# Patient Record
Sex: FEMALE | URBAN - NONMETROPOLITAN AREA
[De-identification: names, ages, dates, MRNs, and addresses within clinical notes are randomized per-mention and may not be internally consistent; named-entity substitution may affect disease eponyms.]

---

## 2022-08-17 ENCOUNTER — APPOINTMENT (OUTPATIENT)
Age: 77
Setting detail: DERMATOLOGY
End: 2022-08-17

## 2022-08-17 DIAGNOSIS — D17 BENIGN LIPOMATOUS NEOPLASM: ICD-10-CM

## 2022-08-17 DIAGNOSIS — L57.0 ACTINIC KERATOSIS: ICD-10-CM

## 2022-08-17 PROBLEM — D48.5 NEOPLASM OF UNCERTAIN BEHAVIOR OF SKIN: Status: ACTIVE | Noted: 2022-08-17

## 2022-08-17 PROBLEM — D17.0 BENIGN LIPOMATOUS NEOPLASM OF SKIN AND SUBCUTANEOUS TISSUE OF HEAD, FACE AND NECK: Status: ACTIVE | Noted: 2022-08-17

## 2022-08-17 PROCEDURE — OTHER ORDER ULTRASOUND: OTHER

## 2022-08-17 PROCEDURE — OTHER COUNSELING: OTHER

## 2022-08-17 PROCEDURE — OTHER MIPS QUALITY: OTHER

## 2022-08-17 PROCEDURE — 99202 OFFICE O/P NEW SF 15 MIN: CPT | Mod: 25

## 2022-08-17 PROCEDURE — 11102 TANGNTL BX SKIN SINGLE LES: CPT

## 2022-08-17 PROCEDURE — OTHER BIOPSY BY SHAVE METHOD: OTHER

## 2022-08-17 ASSESSMENT — LOCATION DETAILED DESCRIPTION DERM
LOCATION DETAILED: RIGHT CENTRAL MALAR CHEEK
LOCATION DETAILED: LEFT DISTAL POSTERIOR UPPER ARM

## 2022-08-17 ASSESSMENT — LOCATION SIMPLE DESCRIPTION DERM
LOCATION SIMPLE: LEFT POSTERIOR UPPER ARM
LOCATION SIMPLE: RIGHT CHEEK

## 2022-08-17 ASSESSMENT — LOCATION ZONE DERM
LOCATION ZONE: ARM
LOCATION ZONE: FACE

## 2022-08-17 NOTE — PROCEDURE: BIOPSY BY SHAVE METHOD
Bill For Surgical Tray: no
Depth Of Biopsy: dermis
Curettage Text: The wound bed was treated with curettage after the biopsy was performed.
Anesthesia Volume In Cc (Will Not Render If 0): 0.5
Cryotherapy Text: The wound bed was treated with cryotherapy after the biopsy was performed.
Biopsy Type: H and E
Additional Anesthesia Volume In Cc (Will Not Render If 0): 0
Was A Bandage Applied: Yes
Electrodesiccation And Curettage Text: The wound bed was treated with electrodesiccation and curettage after the biopsy was performed.
Dressing: bandage
Type Of Destruction Used: Curettage
Anesthesia Type: 1% lidocaine with epinephrine
Consent: Written consent was obtained and risks were reviewed including but not limited to scarring, infection, bleeding, scabbing, incomplete removal, nerve damage and allergy to anesthesia.
Post-Care Instructions: I reviewed with the patient in detail post-care instructions. Patient is to keep the biopsy site dry overnight, and then apply bacitracin twice daily until healed. Patient may apply hydrogen peroxide soaks to remove any crusting.
Silver Nitrate Text: The wound bed was treated with silver nitrate after the biopsy was performed.
Detail Level: Detailed
Hemostasis: Aluminum Chloride
Billing Type: Third-Party Bill
Biopsy Method: Dermablade
Notification Instructions: Patient will be notified of biopsy results. However, patient instructed to call the office if not contacted within 2 weeks.
Wound Care: Bacitracin
Information: Selecting Yes will display possible errors in your note based on the variables you have selected. This validation is only offered as a suggestion for you. PLEASE NOTE THAT THE VALIDATION TEXT WILL BE REMOVED WHEN YOU FINALIZE YOUR NOTE. IF YOU WANT TO FAX A PRELIMINARY NOTE YOU WILL NEED TO TOGGLE THIS TO 'NO' IF YOU DO NOT WANT IT IN YOUR FAXED NOTE.
Electrodesiccation Text: The wound bed was treated with electrodesiccation after the biopsy was performed.

## 2022-08-17 NOTE — PROCEDURE: ORDER ULTRASOUND
Detail Level: Simple
Subcutaneous Lesion Ultrasound Reason: Mass, in right cheek, check vascular and nerve integration.
Ultrasound Protocol: Ultrasound of Subcutaneous Mass
Priority: high
Provider: Bennie Boston
Breast Ultrasound Reason: Sub q mass

## 2022-09-15 ENCOUNTER — APPOINTMENT (OUTPATIENT)
Age: 77
Setting detail: DERMATOLOGY
End: 2022-09-15

## 2022-09-15 DIAGNOSIS — D17 BENIGN LIPOMATOUS NEOPLASM: ICD-10-CM

## 2022-09-15 PROBLEM — D17.0 BENIGN LIPOMATOUS NEOPLASM OF SKIN AND SUBCUTANEOUS TISSUE OF HEAD, FACE AND NECK: Status: ACTIVE | Noted: 2022-09-15

## 2022-09-15 PROBLEM — D04.62 CARCINOMA IN SITU OF SKIN OF LEFT UPPER LIMB, INCLUDING SHOULDER: Status: ACTIVE | Noted: 2022-09-15

## 2022-09-15 PROCEDURE — OTHER ADDITIONAL NOTES: OTHER

## 2022-09-15 PROCEDURE — 99212 OFFICE O/P EST SF 10 MIN: CPT

## 2022-09-15 PROCEDURE — OTHER COUNSELING: OTHER

## 2022-09-15 PROCEDURE — OTHER MIPS QUALITY: OTHER

## 2022-09-15 PROCEDURE — OTHER REFERRAL: OTHER

## 2022-09-15 ASSESSMENT — LOCATION SIMPLE DESCRIPTION DERM: LOCATION SIMPLE: RIGHT CHEEK

## 2022-09-15 ASSESSMENT — LOCATION ZONE DERM: LOCATION ZONE: FACE

## 2022-09-15 ASSESSMENT — LOCATION DETAILED DESCRIPTION DERM: LOCATION DETAILED: RIGHT CENTRAL MALAR CHEEK

## 2022-09-15 NOTE — PROCEDURE: ADDITIONAL NOTES
Detail Level: Simple
Additional Notes: Referral for general surgeon
Render Risk Assessment In Note?: no

## 2022-10-17 ENCOUNTER — APPOINTMENT (OUTPATIENT)
Age: 77
Setting detail: DERMATOLOGY
End: 2022-10-17

## 2022-10-17 PROBLEM — D04.62 CARCINOMA IN SITU OF SKIN OF LEFT UPPER LIMB, INCLUDING SHOULDER: Status: ACTIVE | Noted: 2022-10-17

## 2022-10-17 PROCEDURE — OTHER COUNSELING: OTHER

## 2022-10-17 PROCEDURE — OTHER PRESCRIPTION: OTHER

## 2022-10-17 PROCEDURE — 17313 MOHS 1 STAGE T/A/L: CPT

## 2022-10-17 PROCEDURE — OTHER MOHS SURGERY: OTHER

## 2022-10-17 PROCEDURE — OTHER REPAIR NOTE: OTHER

## 2022-10-17 PROCEDURE — 13121 CMPLX RPR S/A/L 2.6-7.5 CM: CPT

## 2022-10-17 RX ORDER — MUPIROCIN 20 MG/G
OINTMENT TOPICAL BID
Qty: 22 | Refills: 2 | Status: ERX | COMMUNITY
Start: 2022-10-17

## 2022-10-17 RX ORDER — CEPHALEXIN 500 MG/1
CAPSULE ORAL BID
Qty: 28 | Refills: 0 | Status: ERX | COMMUNITY
Start: 2022-10-17

## 2022-11-02 ENCOUNTER — APPOINTMENT (OUTPATIENT)
Age: 77
Setting detail: DERMATOLOGY
End: 2022-11-02

## 2022-11-02 DIAGNOSIS — Z48.02 ENCOUNTER FOR REMOVAL OF SUTURES: ICD-10-CM

## 2022-11-02 PROCEDURE — OTHER COUNSELING: OTHER

## 2022-11-02 PROCEDURE — OTHER SUTURE REMOVAL (GLOBAL PERIOD): OTHER

## 2022-11-02 PROCEDURE — 99024 POSTOP FOLLOW-UP VISIT: CPT

## 2022-11-02 PROCEDURE — OTHER MIPS QUALITY: OTHER

## 2022-11-02 ASSESSMENT — LOCATION ZONE DERM: LOCATION ZONE: ARM

## 2022-11-02 ASSESSMENT — LOCATION DETAILED DESCRIPTION DERM: LOCATION DETAILED: LEFT DISTAL POSTERIOR UPPER ARM

## 2022-11-02 ASSESSMENT — LOCATION SIMPLE DESCRIPTION DERM: LOCATION SIMPLE: LEFT POSTERIOR UPPER ARM

## 2022-11-02 NOTE — PROCEDURE: SUTURE REMOVAL (GLOBAL PERIOD)
Add 09302 Cpt? (Important Note: In 2017 The Use Of 84978 Is Being Tracked By Cms To Determine Future Global Period Reimbursement For Global Periods): yes
Detail Level: Detailed